# Patient Record
Sex: MALE | Race: BLACK OR AFRICAN AMERICAN | NOT HISPANIC OR LATINO | Employment: FULL TIME | URBAN - METROPOLITAN AREA
[De-identification: names, ages, dates, MRNs, and addresses within clinical notes are randomized per-mention and may not be internally consistent; named-entity substitution may affect disease eponyms.]

---

## 2024-06-08 ENCOUNTER — HOSPITAL ENCOUNTER (EMERGENCY)
Facility: HOSPITAL | Age: 24
Discharge: HOME | End: 2024-06-09
Attending: STUDENT IN AN ORGANIZED HEALTH CARE EDUCATION/TRAINING PROGRAM

## 2024-06-08 DIAGNOSIS — M62.82 NON-TRAUMATIC RHABDOMYOLYSIS: ICD-10-CM

## 2024-06-08 DIAGNOSIS — M79.10 MYALGIA: Primary | ICD-10-CM

## 2024-06-08 DIAGNOSIS — R07.9 CHEST PAIN: ICD-10-CM

## 2024-06-08 DIAGNOSIS — N17.9 AKI (ACUTE KIDNEY INJURY): ICD-10-CM

## 2024-06-08 LAB
ALBUMIN SERPL-MCNC: 4.3 G/DL (ref 3.5–5)
ALBUMIN/GLOB SERPL: 1.3 RATIO (ref 1.1–2)
ALP SERPL-CCNC: 69 UNIT/L (ref 40–150)
ALT SERPL-CCNC: 36 UNIT/L (ref 0–55)
ANION GAP SERPL CALC-SCNC: 8 MEQ/L
AST SERPL-CCNC: 92 UNIT/L (ref 5–34)
BACTERIA #/AREA URNS AUTO: ABNORMAL /HPF
BASOPHILS # BLD AUTO: 0.03 X10(3)/MCL
BASOPHILS NFR BLD AUTO: 0.4 %
BILIRUB SERPL-MCNC: 1.3 MG/DL
BILIRUB UR QL STRIP.AUTO: NEGATIVE
BNP BLD-MCNC: <10 PG/ML
BUN SERPL-MCNC: 18.7 MG/DL (ref 8.9–20.6)
CALCIUM SERPL-MCNC: 9.4 MG/DL (ref 8.4–10.2)
CHLORIDE SERPL-SCNC: 103 MMOL/L (ref 98–107)
CK SERPL-CCNC: 4029 U/L (ref 30–200)
CLARITY UR: CLEAR
CO2 SERPL-SCNC: 26 MMOL/L (ref 22–29)
COLOR UR AUTO: ABNORMAL
CREAT SERPL-MCNC: 1.19 MG/DL (ref 0.73–1.18)
CREAT/UREA NIT SERPL: 16
EOSINOPHIL # BLD AUTO: 0.06 X10(3)/MCL (ref 0–0.9)
EOSINOPHIL NFR BLD AUTO: 0.9 %
ERYTHROCYTE [DISTWIDTH] IN BLOOD BY AUTOMATED COUNT: 11.4 % (ref 11.5–17)
GFR SERPLBLD CREATININE-BSD FMLA CKD-EPI: >60 ML/MIN/1.73/M2
GLOBULIN SER-MCNC: 3.4 GM/DL (ref 2.4–3.5)
GLUCOSE SERPL-MCNC: 85 MG/DL (ref 74–100)
GLUCOSE UR QL STRIP: NORMAL
HCT VFR BLD AUTO: 41 % (ref 42–52)
HGB BLD-MCNC: 14.2 G/DL (ref 14–18)
HGB UR QL STRIP: NEGATIVE
IMM GRANULOCYTES # BLD AUTO: 0.02 X10(3)/MCL (ref 0–0.04)
IMM GRANULOCYTES NFR BLD AUTO: 0.3 %
INR PPP: 1
KETONES UR QL STRIP: NEGATIVE
LEUKOCYTE ESTERASE UR QL STRIP: NEGATIVE
LYMPHOCYTES # BLD AUTO: 2.17 X10(3)/MCL (ref 0.6–4.6)
LYMPHOCYTES NFR BLD AUTO: 31.3 %
MCH RBC QN AUTO: 31.1 PG (ref 27–31)
MCHC RBC AUTO-ENTMCNC: 34.6 G/DL (ref 33–36)
MCV RBC AUTO: 89.9 FL (ref 80–94)
MONOCYTES # BLD AUTO: 0.56 X10(3)/MCL (ref 0.1–1.3)
MONOCYTES NFR BLD AUTO: 8.1 %
NEUTROPHILS # BLD AUTO: 4.09 X10(3)/MCL (ref 2.1–9.2)
NEUTROPHILS NFR BLD AUTO: 59 %
NITRITE UR QL STRIP: NEGATIVE
NRBC BLD AUTO-RTO: 0 %
PH UR STRIP: 5.5 [PH]
PLATELET # BLD AUTO: 171 X10(3)/MCL (ref 130–400)
PMV BLD AUTO: 10.1 FL (ref 7.4–10.4)
POTASSIUM SERPL-SCNC: 3.7 MMOL/L (ref 3.5–5.1)
PROT SERPL-MCNC: 7.7 GM/DL (ref 6.4–8.3)
PROT UR QL STRIP: NEGATIVE
PROTHROMBIN TIME: 12.7 SECONDS (ref 12.5–14.5)
RBC # BLD AUTO: 4.56 X10(6)/MCL (ref 4.7–6.1)
RBC #/AREA URNS AUTO: ABNORMAL /HPF
SODIUM SERPL-SCNC: 137 MMOL/L (ref 136–145)
SP GR UR STRIP.AUTO: 1.01 (ref 1–1.03)
SQUAMOUS #/AREA URNS LPF: ABNORMAL /HPF
TROPONIN I SERPL-MCNC: <0.01 NG/ML (ref 0–0.04)
URATE CRY UR QL COMP ASSIST: ABNORMAL
UROBILINOGEN UR STRIP-ACNC: NORMAL
WBC # SPEC AUTO: 6.93 X10(3)/MCL (ref 4.5–11.5)
WBC #/AREA URNS AUTO: ABNORMAL /HPF

## 2024-06-08 PROCEDURE — 96361 HYDRATE IV INFUSION ADD-ON: CPT

## 2024-06-08 PROCEDURE — 82550 ASSAY OF CK (CPK): CPT | Performed by: NURSE PRACTITIONER

## 2024-06-08 PROCEDURE — 81001 URINALYSIS AUTO W/SCOPE: CPT | Performed by: EMERGENCY MEDICINE

## 2024-06-08 PROCEDURE — 25000003 PHARM REV CODE 250: Performed by: NURSE PRACTITIONER

## 2024-06-08 PROCEDURE — 96360 HYDRATION IV INFUSION INIT: CPT

## 2024-06-08 PROCEDURE — 85025 COMPLETE CBC W/AUTO DIFF WBC: CPT | Performed by: EMERGENCY MEDICINE

## 2024-06-08 PROCEDURE — 80053 COMPREHEN METABOLIC PANEL: CPT | Performed by: EMERGENCY MEDICINE

## 2024-06-08 PROCEDURE — 85610 PROTHROMBIN TIME: CPT | Performed by: EMERGENCY MEDICINE

## 2024-06-08 PROCEDURE — 99284 EMERGENCY DEPT VISIT MOD MDM: CPT | Mod: 25

## 2024-06-08 PROCEDURE — 83880 ASSAY OF NATRIURETIC PEPTIDE: CPT | Performed by: EMERGENCY MEDICINE

## 2024-06-08 PROCEDURE — 93010 ELECTROCARDIOGRAM REPORT: CPT | Mod: ,,, | Performed by: INTERNAL MEDICINE

## 2024-06-08 PROCEDURE — 84484 ASSAY OF TROPONIN QUANT: CPT | Performed by: EMERGENCY MEDICINE

## 2024-06-08 PROCEDURE — 84484 ASSAY OF TROPONIN QUANT: CPT

## 2024-06-08 PROCEDURE — 93005 ELECTROCARDIOGRAM TRACING: CPT

## 2024-06-08 RX ADMIN — SODIUM CHLORIDE 1000 ML: 9 INJECTION, SOLUTION INTRAVENOUS at 11:06

## 2024-06-08 NOTE — Clinical Note
"Dmitry"Audrey Crowell was seen and treated in our emergency department on 6/8/2024.  He may return to work on 06/14/2024.       If you have any questions or concerns, please don't hesitate to call.      CONOR Toro RN    "

## 2024-06-09 VITALS
BODY MASS INDEX: 25.9 KG/M2 | WEIGHT: 185 LBS | SYSTOLIC BLOOD PRESSURE: 121 MMHG | RESPIRATION RATE: 16 BRPM | HEART RATE: 62 BPM | OXYGEN SATURATION: 100 % | DIASTOLIC BLOOD PRESSURE: 74 MMHG | HEIGHT: 71 IN | TEMPERATURE: 98 F

## 2024-06-09 LAB
CK SERPL-CCNC: 2930 U/L (ref 30–200)
OHS QRS DURATION: 82 MS
OHS QTC CALCULATION: 403 MS

## 2024-06-09 PROCEDURE — 63600175 PHARM REV CODE 636 W HCPCS: Performed by: NURSE PRACTITIONER

## 2024-06-09 PROCEDURE — 82550 ASSAY OF CK (CPK): CPT | Performed by: STUDENT IN AN ORGANIZED HEALTH CARE EDUCATION/TRAINING PROGRAM

## 2024-06-09 RX ADMIN — SODIUM CHLORIDE, POTASSIUM CHLORIDE, SODIUM LACTATE AND CALCIUM CHLORIDE 1000 ML: 600; 310; 30; 20 INJECTION, SOLUTION INTRAVENOUS at 12:06

## 2024-06-09 RX ADMIN — SODIUM CHLORIDE, POTASSIUM CHLORIDE, SODIUM LACTATE AND CALCIUM CHLORIDE 1000 ML: 600; 310; 30; 20 INJECTION, SOLUTION INTRAVENOUS at 01:06

## 2024-06-09 NOTE — ED TRIAGE NOTES
Pt reports chest pain since yesterday while working outside in the heat. Denies sob, body aches, dysuria. Denies pmhx.

## 2024-06-09 NOTE — ED PROVIDER NOTES
Encounter Date: 6/8/2024       History     Chief Complaint   Patient presents with    Chest Pain     Pt reports chest pain since yesterday while working outside in the heat. Denies sob, body aches, dysuria. Denies pmhx.     See MDM    The history is provided by the patient. No  was used.     Review of patient's allergies indicates:  No Known Allergies  No past medical history on file.  No past surgical history on file.  No family history on file.     Review of Systems   Cardiovascular:  Positive for chest pain.   Musculoskeletal:  Positive for myalgias (yesterday have mainly improved).   All other systems reviewed and are negative.      Physical Exam     Initial Vitals [06/08/24 2126]   BP Pulse Resp Temp SpO2   (!) 157/74 (!) 59 16 98.3 °F (36.8 °C) 99 %      MAP       --         Physical Exam    Nursing note and vitals reviewed.  Constitutional: He appears well-developed and well-nourished.   HENT:   Mouth/Throat: Oropharynx is clear and moist.   Eyes: Conjunctivae are normal.   Neck:   Normal range of motion.  Cardiovascular:  Regular rhythm, normal heart sounds and intact distal pulses.   Bradycardia present.         Pulmonary/Chest: Breath sounds normal. No respiratory distress. He exhibits no tenderness.   Abdominal: Abdomen is soft. Bowel sounds are normal. He exhibits no distension. There is no abdominal tenderness.   Musculoskeletal:         General: Normal range of motion.      Cervical back: Normal range of motion.     Neurological: He is alert and oriented to person, place, and time. He has normal strength.   Skin: Skin is warm and dry.   Psychiatric: He has a normal mood and affect.         ED Course   Procedures  Labs Reviewed   COMPREHENSIVE METABOLIC PANEL - Abnormal; Notable for the following components:       Result Value    Creatinine 1.19 (*)     AST 92 (*)     All other components within normal limits   URINALYSIS, REFLEX TO URINE CULTURE - Abnormal; Notable for the following  components:    Uric Acid Crystals, UA Trace (*)     All other components within normal limits   CBC WITH DIFFERENTIAL - Abnormal; Notable for the following components:    RBC 4.56 (*)     Hct 41.0 (*)     MCH 31.1 (*)     RDW 11.4 (*)     All other components within normal limits   CK - Abnormal; Notable for the following components:    Creatine Kinase 4,029 (*)     All other components within normal limits   CK - Abnormal; Notable for the following components:    Creatine Kinase 2,930 (*)     All other components within normal limits   B-TYPE NATRIURETIC PEPTIDE - Normal   TROPONIN I - Normal   PROTIME-INR - Normal   CBC W/ AUTO DIFFERENTIAL    Narrative:     The following orders were created for panel order CBC auto differential.  Procedure                               Abnormality         Status                     ---------                               -----------         ------                     CBC with Differential[[id number]]       Abnormal            Final result                 Please view results for these tests on the individual orders.        ECG Results              EKG 12-lead (Final result)        Collection Time Result Time QRS Duration OHS QTC Calculation    06/08/24 21:23:24 06/09/24 11:25:29 82 403                     Final result by Interface, Lab In [facility] (06/09/24 11:25:31)                   Narrative:    Test Reason : R07.9,    Vent. Rate : 055 BPM     Atrial Rate : 055 BPM     P-R Int : 154 ms          QRS Dur : 082 ms      QT Int : 422 ms       P-R-T Axes : 066 005 036 degrees     QTc Int : 403 ms    Sinus bradycardia with sinus arrhythmia  Otherwise normal ECG  No previous ECGs available  Confirmed by Moreno Kate MD ([id number]) on 6/9/2024 11:25:28 AM    Referred By:             Confirmed By:Moreno Kate MD                                     EKG 12-lead (Final result)  Result time 06/13/24 08:25:51      Final result by Unknown User (06/13/24 08:25:51)                                       Imaging Results              X-Ray Chest PA And Lateral (Final result)  Result time 06/08/24 21:44:50      Final result by Ino Nunez MD (06/08/24 21:44:50)                   Impression:      No acute cardiopulmonary process identified.      Electronically signed by: Ino Nunez  Date:    06/08/2024  Time:    21:44               Narrative:    EXAMINATION:  XR CHEST PA AND LATERAL    CLINICAL HISTORY:  Chest Pain;    TECHNIQUE:  Two-view    COMPARISON:  None available.    FINDINGS:  Cardiopericardial silhouette is within normal limits. Lungs are without dense focal or segmental consolidation, congestion, pleural effusion or pneumothorax.                                       Medications   sodium chloride 0.9% bolus 1,000 mL 1,000 mL (0 mLs Intravenous Stopped 6/9/24 0000)   lactated ringers bolus 1,000 mL (0 mLs Intravenous Stopped 6/9/24 0115)   lactated ringers bolus 1,000 mL (0 mLs Intravenous Stopped 6/9/24 0230)     Medical Decision Making  24-year-old male presents with working outside yesterday and becoming overheated and dehydrated which then led to vomiting.  He states that he started having muscle cramps all over and including he started to have chest pain.  He went home he did start to be able to drink some Powerade and fluids and his symptoms did start to improve but he is still having the chest pain and he wanted to get checked out.  He has not vomited since yesterday.  He denies any past medical history denies taking any medications daily denies being a smoker denies any drug use denies any recent travel.    EKG shows sinus Delbert with a sinus arrhythmia.  Tropes negative CBC CMP no acute findings.  His CK is a little over 4000.  His creatinine is 1.19 which I do not have a baseline but it is mildly elevated.  Urinalysis does not show any UA blood or any protein.  IV hydration provided here.  He has no longer having any muscle cramps in his arms and legs and his chest pain is improving  since he has been here.  Plan will be to hydrate with 3 L and repeat a CK and see where that is to determine whether he needs to be arbs or can go home.  He is tolerating oral intake he has not vomiting he is young and healthy    Problems Addressed:  ASHLEY (acute kidney injury): acute illness or injury that poses a threat to life or bodily functions  Chest pain: acute illness or injury that poses a threat to life or bodily functions  Myalgia: acute illness or injury that poses a threat to life or bodily functions  Non-traumatic rhabdomyolysis: acute illness or injury that poses a threat to life or bodily functions    Amount and/or Complexity of Data Reviewed  Labs:  Decision-making details documented in ED Course.  Radiology:  Decision-making details documented in ED Course.  ECG/medicine tests:  Decision-making details documented in ED Course.  Discussion of management or test interpretation with external provider(s): Discussed with dr. Nichols who will follow up with CK    Risk  Parenteral controlled substances.  Decision regarding hospitalization.      Additional MDM:   Differential Diagnosis:   Symptom: Chest pain. <> The follow diagnoses were considered and will be evaluated: Acute MI, Chest Wall Pain, Pneumonia and Pneumothorax.              ED Course as of 06/27/24 1037   Prather Jun 09, 2024   0305 CPK(!): 2,930  Repeat CPK appears to be trending down. [RP]      ED Course User Index  [RP] Willy Nichols MD R Patel [facility]   Patient is a 24-year-old male presents to emergency department for muscle cramps, some chest discomfort.  See HPI.  See physical exam.  His lab work is notable for elevated CPK concerning for rhabdo.  EKG with sinus bradycardia.  Rate of 55.  No STEMI.  Shared decision making used to determine disposition.  Patient states he would prefer to go home.  He has been given several L of IV fluids.  He was repeat CPK appears to be trending down.  Patient states he will continued  drink plenty of fluids.  Answered all questions at this time.  Hemodynamically stable for continued outpatient management strict return precautions.  Discussed if any discoloration of urine, brown urine, worsening pain return to the emergency department.  Discussed adequate hydration, staying out of heat for the next few days.  Patient verbalized understanding agreed to plan.    Clinical Impression:  Final diagnoses:  [R07.9] Chest pain  [M79.10] Myalgia (Primary)  [M62.82] Non-traumatic rhabdomyolysis  [N17.9] ASHLEY (acute kidney injury)          ED Disposition Condition    Discharge Stable          ED Prescriptions    None       Follow-up Information       Follow up With Specialties Details Why Contact Info    Primary Care  Call in 1 day  Please call [phone] for a primary care provider.             Willy Nichols MD  06/27/24 1038       Willy Nichols MD  06/27/24 1038

## 2024-06-09 NOTE — DISCHARGE INSTRUCTIONS
Drink plenty of fluids.      Return to the emergency department for any new or worsening symptoms, worsening pain, chest pain, shortness of breath, nausea, vomiting, fever, or any other concerns.